# Patient Record
Sex: FEMALE
[De-identification: names, ages, dates, MRNs, and addresses within clinical notes are randomized per-mention and may not be internally consistent; named-entity substitution may affect disease eponyms.]

---

## 2020-02-02 ENCOUNTER — HOSPITAL ENCOUNTER (EMERGENCY)
Dept: HOSPITAL 92 - ERS | Age: 25
Discharge: HOME | End: 2020-02-02
Payer: COMMERCIAL

## 2020-02-02 DIAGNOSIS — O21.0: Primary | ICD-10-CM

## 2020-02-02 DIAGNOSIS — Z3A.01: ICD-10-CM

## 2020-02-02 LAB
ALBUMIN SERPL BCG-MCNC: 4.3 G/DL (ref 3.5–5)
ALP SERPL-CCNC: 42 U/L (ref 40–110)
ALT SERPL W P-5'-P-CCNC: 9 U/L (ref 8–55)
ANION GAP SERPL CALC-SCNC: 12 MMOL/L (ref 10–20)
AST SERPL-CCNC: 11 U/L (ref 5–34)
BASOPHILS # BLD AUTO: 0.1 THOU/UL (ref 0–0.2)
BASOPHILS NFR BLD AUTO: 0.5 % (ref 0–1)
BILIRUB SERPL-MCNC: 0.3 MG/DL (ref 0.2–1.2)
BUN SERPL-MCNC: 6 MG/DL (ref 7–18.7)
CALCIUM SERPL-MCNC: 9.7 MG/DL (ref 7.8–10.44)
CHLORIDE SERPL-SCNC: 103 MMOL/L (ref 98–107)
CO2 SERPL-SCNC: 24 MMOL/L (ref 22–29)
CREAT CL PREDICTED SERPL C-G-VRATE: 0 ML/MIN (ref 70–130)
EOSINOPHIL # BLD AUTO: 0.2 THOU/UL (ref 0–0.7)
EOSINOPHIL NFR BLD AUTO: 1.9 % (ref 0–10)
GLOBULIN SER CALC-MCNC: 3 G/DL (ref 2.4–3.5)
GLUCOSE SERPL-MCNC: 84 MG/DL (ref 70–105)
GLUCOSE UR STRIP-MCNC: 100 MG/DL
HCG UR QL: POSITIVE
HGB BLD-MCNC: 12.8 G/DL (ref 12–16)
LYMPHOCYTES # BLD: 2.5 THOU/UL (ref 1.2–3.4)
LYMPHOCYTES NFR BLD AUTO: 24.3 % (ref 21–51)
MCH RBC QN AUTO: 30.1 PG (ref 27–31)
MCV RBC AUTO: 89.8 FL (ref 78–98)
MONOCYTES # BLD AUTO: 0.7 THOU/UL (ref 0.11–0.59)
MONOCYTES NFR BLD AUTO: 6.5 % (ref 0–10)
NEUTROPHILS # BLD AUTO: 6.9 THOU/UL (ref 1.4–6.5)
NEUTROPHILS NFR BLD AUTO: 66.7 % (ref 42–75)
PLATELET # BLD AUTO: 312 THOU/UL (ref 130–400)
POTASSIUM SERPL-SCNC: 3.8 MMOL/L (ref 3.5–5.1)
PREGU CONTROL BACKGROUND?: (no result)
PREGU CONTROL BAR APPEAR?: YES
RBC # BLD AUTO: 4.24 MILL/UL (ref 4.2–5.4)
SODIUM SERPL-SCNC: 135 MMOL/L (ref 136–145)
WBC # BLD AUTO: 10.4 THOU/UL (ref 4.8–10.8)

## 2020-02-02 PROCEDURE — 96374 THER/PROPH/DIAG INJ IV PUSH: CPT

## 2020-02-02 PROCEDURE — 86900 BLOOD TYPING SEROLOGIC ABO: CPT

## 2020-02-02 PROCEDURE — 80053 COMPREHEN METABOLIC PANEL: CPT

## 2020-02-02 PROCEDURE — 84702 CHORIONIC GONADOTROPIN TEST: CPT

## 2020-02-02 PROCEDURE — 36415 COLL VENOUS BLD VENIPUNCTURE: CPT

## 2020-02-02 PROCEDURE — 76856 US EXAM PELVIC COMPLETE: CPT

## 2020-02-02 PROCEDURE — 85025 COMPLETE CBC W/AUTO DIFF WBC: CPT

## 2020-02-02 PROCEDURE — 86901 BLOOD TYPING SEROLOGIC RH(D): CPT

## 2020-02-02 PROCEDURE — 96361 HYDRATE IV INFUSION ADD-ON: CPT

## 2020-02-02 PROCEDURE — 81025 URINE PREGNANCY TEST: CPT

## 2020-02-02 PROCEDURE — 81003 URINALYSIS AUTO W/O SCOPE: CPT

## 2020-02-02 NOTE — ULT
First trimester obstetrical ultrasound



INDICATION: History of nausea and vomiting with pregnancy



FINDINGS: There is a single live intrauterine gestation with a fetal pole and yolk sac identified. Ca
rdiac activity is noted at 175 bpm. The crown-rump length was 3.23 cm giving estimated gestational

age of 10 weeks and 1 day with an estimated due date of August 29, 2020. The clinical age is 9 weeks 
and 5 days with estimated due date of September 1, 2020. 



The uterus measured 10.0 x 7.6 x 8.8 cm. The left ovary measured 2.9 x 1.6 x 3.7 cm. The right measur
es 3.6 x 1.7 3.5 cm. There is normal flow within both ovaries. No free fluid is evident.



IMPRESSION: Single live intrauterine gestation.



Reported By: Mick Wetzel 

Electronically Signed:  2/2/2020 7:56 PM

## 2020-04-06 ENCOUNTER — HOSPITAL ENCOUNTER (EMERGENCY)
Dept: HOSPITAL 92 - ERS | Age: 25
Discharge: HOME | End: 2020-04-06
Payer: COMMERCIAL

## 2020-04-06 DIAGNOSIS — Z3A.18: ICD-10-CM

## 2020-04-06 DIAGNOSIS — O21.9: Primary | ICD-10-CM

## 2020-04-06 LAB
ALBUMIN SERPL BCG-MCNC: 4.2 G/DL (ref 3.5–5)
ALP SERPL-CCNC: 53 U/L (ref 40–110)
ALT SERPL W P-5'-P-CCNC: 80 U/L (ref 8–55)
ANION GAP SERPL CALC-SCNC: 12 MMOL/L (ref 10–20)
AST SERPL-CCNC: 40 U/L (ref 5–34)
BACTERIA UR QL AUTO: (no result) HPF
BASOPHILS # BLD AUTO: 0.1 THOU/UL (ref 0–0.2)
BASOPHILS NFR BLD AUTO: 0.9 % (ref 0–1)
BILIRUB SERPL-MCNC: 0.4 MG/DL (ref 0.2–1.2)
BUN SERPL-MCNC: 7 MG/DL (ref 7–18.7)
CALCIUM SERPL-MCNC: 9.5 MG/DL (ref 7.8–10.44)
CHLORIDE SERPL-SCNC: 102 MMOL/L (ref 98–107)
CO2 SERPL-SCNC: 25 MMOL/L (ref 22–29)
CREAT CL PREDICTED SERPL C-G-VRATE: 0 ML/MIN (ref 70–130)
EOSINOPHIL # BLD AUTO: 0.1 THOU/UL (ref 0–0.7)
EOSINOPHIL NFR BLD AUTO: 1 % (ref 0–10)
GLOBULIN SER CALC-MCNC: 3.4 G/DL (ref 2.4–3.5)
GLUCOSE SERPL-MCNC: 79 MG/DL (ref 70–105)
GLUCOSE UR STRIP-MCNC: 200 MG/DL
HGB BLD-MCNC: 13.6 G/DL (ref 12–16)
LEUKOCYTE ESTERASE UR QL STRIP.AUTO: 500 LEU/UL
LYMPHOCYTES # BLD: 2 THOU/UL (ref 1.2–3.4)
LYMPHOCYTES NFR BLD AUTO: 15 % (ref 21–51)
MCH RBC QN AUTO: 31.6 PG (ref 27–31)
MCV RBC AUTO: 93.1 FL (ref 78–98)
MONOCYTES # BLD AUTO: 0.7 THOU/UL (ref 0.11–0.59)
MONOCYTES NFR BLD AUTO: 5.1 % (ref 0–10)
MUCOUS THREADS UR QL AUTO: (no result) LPF
NEUTROPHILS # BLD AUTO: 10.3 THOU/UL (ref 1.4–6.5)
NEUTROPHILS NFR BLD AUTO: 78.1 % (ref 42–75)
PLATELET # BLD AUTO: 395 THOU/UL (ref 130–400)
POTASSIUM SERPL-SCNC: 3.3 MMOL/L (ref 3.5–5.1)
PROT UR STRIP.AUTO-MCNC: 20 MG/DL
RBC # BLD AUTO: 4.31 MILL/UL (ref 4.2–5.4)
RBC UR QL AUTO: (no result) HPF (ref 0–3)
SODIUM SERPL-SCNC: 136 MMOL/L (ref 136–145)
WBC # BLD AUTO: 13.2 THOU/UL (ref 4.8–10.8)

## 2020-04-06 PROCEDURE — 81015 MICROSCOPIC EXAM OF URINE: CPT

## 2020-04-06 PROCEDURE — 80053 COMPREHEN METABOLIC PANEL: CPT

## 2020-04-06 PROCEDURE — 96361 HYDRATE IV INFUSION ADD-ON: CPT

## 2020-04-06 PROCEDURE — 94760 N-INVAS EAR/PLS OXIMETRY 1: CPT

## 2020-04-06 PROCEDURE — 85025 COMPLETE CBC W/AUTO DIFF WBC: CPT

## 2020-04-06 PROCEDURE — 93005 ELECTROCARDIOGRAM TRACING: CPT

## 2020-04-06 PROCEDURE — 96374 THER/PROPH/DIAG INJ IV PUSH: CPT

## 2020-04-06 PROCEDURE — 81003 URINALYSIS AUTO W/O SCOPE: CPT

## 2020-04-08 NOTE — EKG
Test Reason : 

Blood Pressure : ***/*** mmHG

Vent. Rate : 087 BPM     Atrial Rate : 087 BPM

   P-R Int : 128 ms          QRS Dur : 072 ms

    QT Int : 360 ms       P-R-T Axes : 068 084 033 degrees

   QTc Int : 433 ms

 

Normal sinus rhythm with sinus arrhythmia

Normal ECG

 

Confirmed by LOVE LEON ([id number]),  MARIO GOLDMAN ([id number]) on 4/8/2020 1:34:15 PM

 

Referred By:             Confirmed By:LOVE LEON

## 2020-09-04 ENCOUNTER — HOSPITAL ENCOUNTER (OUTPATIENT)
Dept: HOSPITAL 92 - LABSCS | Age: 25
Discharge: HOME | End: 2020-09-04
Attending: OBSTETRICS & GYNECOLOGY
Payer: COMMERCIAL

## 2020-09-04 DIAGNOSIS — Z20.828: Primary | ICD-10-CM

## 2020-09-04 PROCEDURE — U0003 INFECTIOUS AGENT DETECTION BY NUCLEIC ACID (DNA OR RNA); SEVERE ACUTE RESPIRATORY SYNDROME CORONAVIRUS 2 (SARS-COV-2) (CORONAVIRUS DISEASE [COVID-19]), AMPLIFIED PROBE TECHNIQUE, MAKING USE OF HIGH THROUGHPUT TECHNOLOGIES AS DESCRIBED BY CMS-2020-01-R: HCPCS

## 2020-09-04 PROCEDURE — 87635 SARS-COV-2 COVID-19 AMP PRB: CPT

## 2020-09-08 ENCOUNTER — HOSPITAL ENCOUNTER (INPATIENT)
Dept: HOSPITAL 92 - L&D/OP | Age: 25
LOS: 2 days | Discharge: HOME | End: 2020-09-10
Attending: OBSTETRICS & GYNECOLOGY | Admitting: OBSTETRICS & GYNECOLOGY
Payer: SELF-PAY

## 2020-09-08 VITALS — BODY MASS INDEX: 25.8 KG/M2

## 2020-09-08 DIAGNOSIS — Z3A.41: ICD-10-CM

## 2020-09-08 DIAGNOSIS — O48.0: Primary | ICD-10-CM

## 2020-09-08 LAB
HBSAG INDEX: 0.13 S/CO (ref 0–0.99)
HGB BLD-MCNC: 14.2 G/DL (ref 12–16)
MCH RBC QN AUTO: 32.2 PG (ref 27–31)
MCV RBC AUTO: 95 FL (ref 78–98)
PLATELET # BLD AUTO: 211 THOU/UL (ref 130–400)
RBC # BLD AUTO: 4.4 MILL/UL (ref 4.2–5.4)
SYPHILIS ANTIBODY INDEX: 0.03 S/CO
WBC # BLD AUTO: 9.5 THOU/UL (ref 4.8–10.8)

## 2020-09-08 PROCEDURE — 86901 BLOOD TYPING SEROLOGIC RH(D): CPT

## 2020-09-08 PROCEDURE — 90707 MMR VACCINE SC: CPT

## 2020-09-08 PROCEDURE — 87340 HEPATITIS B SURFACE AG IA: CPT

## 2020-09-08 PROCEDURE — 86900 BLOOD TYPING SEROLOGIC ABO: CPT

## 2020-09-08 PROCEDURE — 36415 COLL VENOUS BLD VENIPUNCTURE: CPT

## 2020-09-08 PROCEDURE — 86780 TREPONEMA PALLIDUM: CPT

## 2020-09-08 PROCEDURE — 85027 COMPLETE CBC AUTOMATED: CPT

## 2020-09-08 PROCEDURE — 51702 INSERT TEMP BLADDER CATH: CPT

## 2020-09-08 PROCEDURE — 99285 EMERGENCY DEPT VISIT HI MDM: CPT

## 2020-09-08 PROCEDURE — 86850 RBC ANTIBODY SCREEN: CPT

## 2020-09-08 RX ADMIN — Medication PRN MLS: at 20:04

## 2020-09-08 RX ADMIN — Medication PRN MLS: at 20:03

## 2020-09-08 NOTE — PDOC.LDHP
Labor and Delivery H&P


Chief complaint: contractions


HPI: 





26yo  at 41w0d by LMP here for contractions, no change in SVE since appt 

last week, occ ctx, no LOF VB.  No PIH sx


Current gestational age (weeks): 41


Due date: 20


Dating criteria: last menstrual period


Grav: 1


Para: 0


Current pregnancy complications: none


Abnormal US findings: No


Past Medical History: 





denies


Current medications: pre- vitamins


Previous surgical history: none


Allergies/Adverse Reactions: 


 Allergies











Allergy/AdvReac Type Severity Reaction Status Date / Time


 


No Known Drug Allergies Allergy   Verified 20 08:32











Social history: none





- Physical Exam


Vital signs reviewed and normal: yes


General: NAD


Heart: RRR


Lungs: CTAB


Abdomen: gravid


Extremeties: no edema


FHT: category 1


[place] contractions every: 3-5min





- Vaginal Exam


cm dilated: 4


Effacement: 75%


Station: -1 (arom clear)





- OB Labs


Blood type: A


RH: positive


Antibody Screen: negative


HIV: negative


RPR: negative


HEPSAg: negative


1 hour GCT: negative


GBS: negative


Urine drug screen: negative


Rubella: non-immune





- Assessment


L&D Assessment: elective induction at term





- Plan


Plan: admit to L&D, labor augmentation if indicated, informed consent obtained, 

anesthesia consult for pain management

## 2020-09-09 LAB
HGB BLD-MCNC: 12.2 G/DL (ref 12–16)
MCH RBC QN AUTO: 32 PG (ref 27–31)
MCV RBC AUTO: 96.2 FL (ref 78–98)
PLATELET # BLD AUTO: 189 THOU/UL (ref 130–400)
RBC # BLD AUTO: 3.82 MILL/UL (ref 4.2–5.4)
WBC # BLD AUTO: 12.1 THOU/UL (ref 4.8–10.8)

## 2020-09-09 PROCEDURE — 0DQR0ZZ REPAIR ANAL SPHINCTER, OPEN APPROACH: ICD-10-PCS | Performed by: OBSTETRICS & GYNECOLOGY

## 2020-09-09 RX ADMIN — DOCUSATE CALCIUM SCH MG: 240 CAPSULE, LIQUID FILLED ORAL at 20:34

## 2020-09-09 RX ADMIN — DOCUSATE CALCIUM SCH MG: 240 CAPSULE, LIQUID FILLED ORAL at 08:23

## 2020-09-09 NOTE — PDOC.PP
Post Partum Progress Note


Post Partum Day #: 1


PO intake tolerated: yes


Flatus: yes


Ambulation: yes


 Vital Signs (12 hours)











  Temp Pulse Resp BP


 


 09/09/20 08:00  97.9 F  72  18  114/76


 


 09/09/20 05:41  97.9 F  61  12  100/65








 Weight











Weight                         146 lb

















- Physical Examination


General: NAD


Respiratory: non-labored breathing


Abdominal: no distention, appropriately TTP


Fundus firm & at: umb


Skin: no rash


Neurological: no gross focal deficits


Psychiatric: normal affect


Result Diagrams: 


 09/09/20 05:49





Additional Labs: 


 Post Partum Labs











Blood Type  A POSITIVE   09/08/20  10:19    


 


Hep Bs Antigen  Non-Reactive S/CO (NonReactive)   09/08/20  10:19    














- Assessment/Plan





PPD1 s/p TSVD


VSSAF


Doing well, discussed perineal pain mgmt.  


Breastfeeding, LC consult done


Rh pos RNI- MMR PP


Cont PP care, home tomorrow.

## 2020-09-09 NOTE — PDOC.OPDEL
OB Operative/Delivery Note


Delivery Dr/Surgeon: Fuentes


Assist: n/a


Pre-Delivery Diagnosis: elective induction


Procedure/Post Delivery Dx: spontaneous vaginal delivery


Weeks gestation: 41


Anesthesia: epidural





- Findings


  ** A


Sex: female


Apgar - 1 min: 8


Apgar - 5 min: 9





- Additional Findings/Plan


Placenta delivered: spontaneous


Repaired Obstetrical Laceration: 3rd degree


Estimated blood loss: 350cc


Post delivery plan: routine recovery

## 2020-09-10 VITALS — TEMPERATURE: 97.8 F | DIASTOLIC BLOOD PRESSURE: 72 MMHG | SYSTOLIC BLOOD PRESSURE: 115 MMHG

## 2020-09-10 RX ADMIN — DOCUSATE CALCIUM SCH MG: 240 CAPSULE, LIQUID FILLED ORAL at 08:36

## 2020-09-10 NOTE — PDOC.PP
Post Partum Progress Note


Post Partum Day #: 2


PO intake tolerated: yes


Flatus: yes


Ambulation: yes


 Vital Signs (12 hours)











  Temp Pulse Resp BP Pulse Ox


 


 09/10/20 08:24  97.8 F  65  20  115/72  98








 Weight











Weight                         146 lb

















- Physical Examination


General: NAD


Respiratory: non-labored breathing


Abdominal: no distention, appropriately TTP


Fundus firm & at: -2


Neurological: no gross focal deficits


Psychiatric: normal affect


Result Diagrams: 


 09/09/20 05:49





Additional Labs: 


 Post Partum Labs











Blood Type  A POSITIVE   09/08/20  10:19    


 


Hep Bs Antigen  Non-Reactive S/CO (NonReactive)   09/08/20  10:19    














- Assessment/Plan





PPD2 s/p TSVD


VSSAF 


Doing well, pain controlled


Breastfeeding


RNI for MMR prior to DC


DC home FU 6w